# Patient Record
Sex: FEMALE | Race: BLACK OR AFRICAN AMERICAN | NOT HISPANIC OR LATINO | Employment: OTHER | URBAN - METROPOLITAN AREA
[De-identification: names, ages, dates, MRNs, and addresses within clinical notes are randomized per-mention and may not be internally consistent; named-entity substitution may affect disease eponyms.]

---

## 2024-10-24 ENCOUNTER — OUTPATIENT CASE MANAGEMENT (OUTPATIENT)
Dept: ADMINISTRATIVE | Facility: OTHER | Age: 64
End: 2024-10-24

## 2024-10-24 NOTE — PROGRESS NOTES
Consult received to discuss assistance with medical insurance. Called pt [phone]. Pt stated that she had contacted Medicare office and her insurance card is in transit to her home. SW provided [facility] financial counseling office phone number [phone]. Pt voiced she understood.SW completed [employer]. No further needs at this time.     [place] [facility] [place]  [phone]